# Patient Record
Sex: FEMALE | Race: WHITE | NOT HISPANIC OR LATINO | URBAN - METROPOLITAN AREA
[De-identification: names, ages, dates, MRNs, and addresses within clinical notes are randomized per-mention and may not be internally consistent; named-entity substitution may affect disease eponyms.]

---

## 2017-03-27 ENCOUNTER — IMPORTED ENCOUNTER (OUTPATIENT)
Dept: URBAN - METROPOLITAN AREA CLINIC 9 | Facility: CLINIC | Age: 52
End: 2017-03-27

## 2017-05-08 NOTE — PATIENT DISCUSSION
New Prescription: erythromycin (erythromycin): ointment: 5 mg/gram (0.5 %) a small amount at bedtime as directed on eyelid 05-

## 2017-05-08 NOTE — PATIENT DISCUSSION
New Prescription: TobraDex (tobramycin-dexamethasone): drops,suspension: 0.3-0.1% 1 drop four times a day into right eye 05-

## 2017-05-22 NOTE — PATIENT DISCUSSION
Continue: erythromycin (erythromycin): ointment: 5 mg/gram (0.5 %) a small amount at bedtime as directed on eyelid 05-

## 2017-05-22 NOTE — PATIENT DISCUSSION
New Prescription: TobraDex (tobramycin-dexamethasone): drops,suspension: 0.3-0.1% 1 drop three times a day into right eye 05-

## 2017-05-22 NOTE — PATIENT DISCUSSION
BLEPHAROCONJUNCTIVITIS, OU. PT EDUCATION. RX ZYLET / Maricel Motts TID OU. MONITOR. RTO AS SCHEDULED.

## 2018-01-31 NOTE — PATIENT DISCUSSION
New Prescription: bromfenac (bromfenac): drops: 0.09% 1 drop once a day as directed into both eyes 01-

## 2018-01-31 NOTE — PATIENT DISCUSSION
Continue: TobraDex (tobramycin-dexamethasone): drops,suspension: 0.3-0.1% 1 drop three times a day into right eye 05-

## 2018-02-01 NOTE — PATIENT DISCUSSION
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 RETURN AS SCHEDULED.

## 2019-01-16 ENCOUNTER — IMPORTED ENCOUNTER (OUTPATIENT)
Dept: URBAN - METROPOLITAN AREA CLINIC 9 | Facility: CLINIC | Age: 54
End: 2019-01-16

## 2019-09-10 NOTE — PATIENT DISCUSSION
Stopped Today: TobraDex (tobramycin-dexamethasone): drops,suspension: 0.3-0.1% 1 drop three times a day into right eye 05-

## 2019-11-21 ENCOUNTER — IMPORTED ENCOUNTER (OUTPATIENT)
Dept: URBAN - METROPOLITAN AREA CLINIC 9 | Facility: CLINIC | Age: 54
End: 2019-11-21

## 2021-10-16 ASSESSMENT — TONOMETRY
OS_IOP_MMHG: 9
OS_IOP_MMHG: 10
OD_IOP_MMHG: 9
OD_IOP_MMHG: 11
OS_IOP_MMHG: 12
OD_IOP_MMHG: 12

## 2021-10-16 ASSESSMENT — VISUAL ACUITY
OD_CC: 20/20 - SN
OD_CC: 20/20 - SN
OS_CC: 20/20 SN
OS_SC: 20/400 SN
OD_CC: 20/20 SN
OS_CC: 20/20 SN
OS_SC: 20/400 SN
OD_SC: 20/30 SN
OS_CC: 20/40 -2 SN
OD_CC: 20/20 SN
OS_CC: 20/20 SN
OD_CC: 20/20 SN
OD_SC: 20/20 SN

## 2021-10-16 ASSESSMENT — KERATOMETRY
OD_K1POWER_DIOPTERS: 45
OS_AXISANGLE2_DEGREES: 86
OD_K1POWER_DIOPTERS: 45.375
OS_K1POWER_DIOPTERS: 45.375
OS_K1POWER_DIOPTERS: 45.25
OS_AXISANGLE_DEGREES: 125
OD_K2POWER_DIOPTERS: 47
OS_K2POWER_DIOPTERS: 45
OS_AXISANGLE_DEGREES: 176
OS_AXISANGLE2_DEGREES: 35
OD_AXISANGLE_DEGREES: 167
OS_AXISANGLE2_DEGREES: 86
OD_AXISANGLE_DEGREES: 167
OS_K2POWER_DIOPTERS: 48
OS_K2POWER_DIOPTERS: 47.75
OD_K2POWER_DIOPTERS: 47.375
OS_K1POWER_DIOPTERS: 45.5
OD_K1POWER_DIOPTERS: 44.75
OD_AXISANGLE2_DEGREES: 81
OD_AXISANGLE_DEGREES: 171
OD_AXISANGLE2_DEGREES: 77
OD_K2POWER_DIOPTERS: 47
OS_AXISANGLE_DEGREES: 176
OD_AXISANGLE2_DEGREES: 77

## 2021-10-20 ENCOUNTER — ESTABLISHED PATIENT (OUTPATIENT)
Dept: URBAN - METROPOLITAN AREA CLINIC 4 | Facility: CLINIC | Age: 56
End: 2021-10-20

## 2021-10-20 DIAGNOSIS — H43.813: ICD-10-CM

## 2021-10-20 DIAGNOSIS — H04.123: ICD-10-CM

## 2021-10-20 DIAGNOSIS — H02.831: ICD-10-CM

## 2021-10-20 DIAGNOSIS — H02.834: ICD-10-CM

## 2021-10-20 DIAGNOSIS — H26.493: ICD-10-CM

## 2021-10-20 PROCEDURE — 92014 COMPRE OPH EXAM EST PT 1/>: CPT

## 2021-10-20 PROCEDURE — 92015 DETERMINE REFRACTIVE STATE: CPT

## 2021-10-20 ASSESSMENT — KERATOMETRY
OS_K1POWER_DIOPTERS: 45.50
OD_K1POWER_DIOPTERS: 45
OD_AXISANGLE_DEGREES: 172
OS_K2POWER_DIOPTERS: 47.75
OS_AXISANGLE_DEGREES: 176
OD_K2POWER_DIOPTERS: 47
OS_AXISANGLE2_DEGREES: 86
OD_AXISANGLE2_DEGREES: 82

## 2021-10-20 ASSESSMENT — TONOMETRY
OS_IOP_MMHG: 14
OD_IOP_MMHG: 14

## 2021-10-20 ASSESSMENT — VISUAL ACUITY
OS_SC: J7
OD_SC: 20/20-2
OS_SC: 20/200-1
OD_GLARE: 20/40
OU_SC: J7
OS_GLARE: 20/40-1
OU_SC: 20/25

## 2022-01-13 ENCOUNTER — POST-OP (OUTPATIENT)
Dept: URBAN - METROPOLITAN AREA CLINIC 4 | Facility: CLINIC | Age: 57
End: 2022-01-13

## 2022-01-13 DIAGNOSIS — Z98.890: ICD-10-CM

## 2022-01-13 PROCEDURE — 99024 POSTOP FOLLOW-UP VISIT: CPT

## 2022-01-13 ASSESSMENT — VISUAL ACUITY
OS_SC: J2
OD_SC: 20/30
OS_SC: 20/400

## 2022-01-13 ASSESSMENT — KERATOMETRY
OD_K2POWER_DIOPTERS: 47
OS_AXISANGLE2_DEGREES: 86
OD_AXISANGLE_DEGREES: 172
OS_K2POWER_DIOPTERS: 47.75
OS_AXISANGLE_DEGREES: 176
OD_AXISANGLE2_DEGREES: 82
OD_K1POWER_DIOPTERS: 45
OS_K1POWER_DIOPTERS: 45.50

## 2022-01-13 ASSESSMENT — TONOMETRY
OD_IOP_MMHG: 14
OS_IOP_MMHG: 13

## 2022-05-11 ENCOUNTER — ESTABLISHED PATIENT (OUTPATIENT)
Dept: URBAN - METROPOLITAN AREA CLINIC 4 | Facility: CLINIC | Age: 57
End: 2022-05-11

## 2022-05-11 DIAGNOSIS — H04.123: ICD-10-CM

## 2022-05-11 DIAGNOSIS — H43.813: ICD-10-CM

## 2022-05-11 DIAGNOSIS — H53.10: ICD-10-CM

## 2022-05-11 PROCEDURE — 92134 CPTRZ OPH DX IMG PST SGM RTA: CPT

## 2022-05-11 PROCEDURE — 99213 OFFICE O/P EST LOW 20 MIN: CPT

## 2022-05-11 ASSESSMENT — KERATOMETRY
OD_AXISANGLE2_DEGREES: 82
OS_K2POWER_DIOPTERS: 47.75
OD_K1POWER_DIOPTERS: 45
OS_AXISANGLE_DEGREES: 176
OS_AXISANGLE2_DEGREES: 86
OD_AXISANGLE_DEGREES: 172
OD_K2POWER_DIOPTERS: 47
OS_K1POWER_DIOPTERS: 45.50

## 2022-05-11 ASSESSMENT — VISUAL ACUITY
OS_CC: 20/20
OD_CC: 20/20
OU_CC: 20/20

## 2022-05-11 ASSESSMENT — TONOMETRY
OD_IOP_MMHG: 14
OS_IOP_MMHG: 14

## 2022-06-22 RX ORDER — TRAMADOL HYDROCHLORIDE 50 MG/1
TABLET ORAL
COMMUNITY
Start: 2021-09-05 | End: 2022-11-04

## 2022-06-22 RX ORDER — ESTRADIOL 0.1 MG/G
CREAM VAGINAL
COMMUNITY
Start: 2022-05-02 | End: 2022-09-24 | Stop reason: SDUPTHER

## 2022-06-22 RX ORDER — ESZOPICLONE 2 MG/1
TABLET, FILM COATED ORAL
COMMUNITY
Start: 2022-04-28 | End: 2022-11-04 | Stop reason: SDUPTHER

## 2022-06-22 RX ORDER — CIPROFLOXACIN AND DEXAMETHASONE 3; 1 MG/ML; MG/ML
SUSPENSION/ DROPS AURICULAR (OTIC)
COMMUNITY
Start: 2021-08-01 | End: 2022-11-04

## 2022-06-22 RX ORDER — ERGOCALCIFEROL (VITAMIN D2) 1250 MCG
CAPSULE ORAL
COMMUNITY
End: 2022-11-04

## 2022-06-22 RX ORDER — ESTRADIOL 2 MG/1
TABLET ORAL
COMMUNITY
End: 2022-11-04

## 2022-06-22 RX ORDER — LEVOTHYROXINE SODIUM 75 UG/1
CAPSULE ORAL
COMMUNITY

## 2022-06-22 RX ORDER — ALBUTEROL SULFATE 90 UG/1
AEROSOL, METERED RESPIRATORY (INHALATION)
COMMUNITY
Start: 2020-12-31 | End: 2022-11-04

## 2022-06-22 RX ORDER — PANTOPRAZOLE SODIUM 20 MG/1
TABLET, DELAYED RELEASE ORAL
COMMUNITY

## 2022-09-23 NOTE — PATIENT DISCUSSION
Avastin 1.25MG (Office) 1.25 mg / 0.05 ml - OD: Surgeon: Zulema Johnson Next Office Visit: 11/9/22  Last Office Visit: 11/10/21  Last refilled: 9/28/21  Medication: sertraline (ZOLOFT) 100 MG tablet    Last refilled: 9/28/22  Medication: buPROPion (WELLBUTRIN SR) 150 MG 12 hr tablet

## 2022-09-24 PROBLEM — R39.15 URINARY URGENCY: Status: ACTIVE | Noted: 2022-09-24

## 2022-09-24 PROBLEM — M77.9 CAPSULITIS: Status: ACTIVE | Noted: 2022-09-24

## 2022-09-24 PROBLEM — R53.82 CHRONIC FATIGUE: Status: ACTIVE | Noted: 2022-09-24

## 2022-09-24 PROBLEM — R79.89 ABNORMAL LIVER FUNCTION TESTS: Status: ACTIVE | Noted: 2022-09-24

## 2022-09-24 PROBLEM — N30.00 ACUTE CYSTITIS WITHOUT HEMATURIA: Status: ACTIVE | Noted: 2022-09-24

## 2022-09-24 PROBLEM — R35.0 INCREASED FREQUENCY OF URINATION: Status: ACTIVE | Noted: 2022-09-24

## 2022-09-24 PROBLEM — E03.9 ACQUIRED HYPOTHYROIDISM: Status: ACTIVE | Noted: 2022-09-24

## 2022-09-24 PROBLEM — M72.2 PLANTAR FASCIITIS: Status: ACTIVE | Noted: 2022-09-24

## 2022-09-24 PROBLEM — H60.90 OTITIS EXTERNA: Status: ACTIVE | Noted: 2022-09-24

## 2022-09-24 PROBLEM — M54.42 ACUTE LEFT-SIDED LOW BACK PAIN WITH LEFT-SIDED SCIATICA: Status: ACTIVE | Noted: 2022-09-24

## 2022-09-24 PROBLEM — M77.10 LATERAL EPICONDYLITIS: Status: ACTIVE | Noted: 2022-09-24

## 2022-09-24 PROBLEM — M19.079 PRIMARY LOCALIZED OSTEOARTHROSIS OF ANKLE AND FOOT: Status: ACTIVE | Noted: 2022-09-24

## 2022-09-24 PROBLEM — M25.521 RIGHT ELBOW PAIN: Status: ACTIVE | Noted: 2022-09-24

## 2022-09-24 PROBLEM — M79.672 LEFT FOOT PAIN: Status: ACTIVE | Noted: 2022-09-24

## 2022-09-24 PROBLEM — L81.9 HYPERPIGMENTATION: Status: ACTIVE | Noted: 2022-09-24

## 2022-09-24 PROBLEM — G47.00 INSOMNIA: Status: ACTIVE | Noted: 2022-09-24

## 2022-09-24 PROBLEM — N20.0 CALCULUS OF KIDNEY: Status: ACTIVE | Noted: 2022-09-24

## 2022-09-24 PROBLEM — M54.2 NECK PAIN: Status: ACTIVE | Noted: 2022-09-24

## 2022-09-24 PROBLEM — M47.812 FACET JOINT DISEASE OF CERVICAL REGION: Status: ACTIVE | Noted: 2022-09-24

## 2022-09-24 PROBLEM — N39.41 URGE INCONTINENCE OF URINE: Status: ACTIVE | Noted: 2022-09-24

## 2022-09-24 PROBLEM — N32.89 BLADDER WALL THICKENING: Status: ACTIVE | Noted: 2022-09-24

## 2022-09-24 PROBLEM — B00.9 HERPES SIMPLEX: Status: ACTIVE | Noted: 2022-09-24

## 2022-09-24 PROBLEM — M50.30 DEGENERATIVE DISC DISEASE, CERVICAL: Status: ACTIVE | Noted: 2022-09-24

## 2022-09-24 PROBLEM — R92.8 ABNORMAL MAMMOGRAM OF RIGHT BREAST: Status: ACTIVE | Noted: 2022-09-24

## 2022-09-24 PROBLEM — G57.60 PLANTAR NERVE LESION: Status: ACTIVE | Noted: 2022-09-24
